# Patient Record
Sex: MALE | Race: WHITE | URBAN - METROPOLITAN AREA
[De-identification: names, ages, dates, MRNs, and addresses within clinical notes are randomized per-mention and may not be internally consistent; named-entity substitution may affect disease eponyms.]

---

## 2021-05-12 ENCOUNTER — OFFICE VISIT (OUTPATIENT)
Dept: URBAN - METROPOLITAN AREA CLINIC 51 | Facility: CLINIC | Age: 76
End: 2021-05-12
Payer: MEDICARE

## 2021-05-12 DIAGNOSIS — H35.363 DRUSEN (DEGENERATIVE) OF MACULA, BILATERAL: ICD-10-CM

## 2021-05-12 DIAGNOSIS — H52.4 PRESBYOPIA: ICD-10-CM

## 2021-05-12 DIAGNOSIS — H16.143 PUNCTATE KERATITIS, BILATERAL: ICD-10-CM

## 2021-05-12 DIAGNOSIS — H43.813 VITREOUS DEGENERATION, BILATERAL: ICD-10-CM

## 2021-05-12 DIAGNOSIS — H25.813 COMBINED FORMS OF AGE-RELATED CATARACT, BILATERAL: ICD-10-CM

## 2021-05-12 DIAGNOSIS — H33.199 OTHER RETINOSCHISIS AND RETINAL CYSTS, UNSPECIFIED EYE: Primary | ICD-10-CM

## 2021-05-12 PROCEDURE — 92134 CPTRZ OPH DX IMG PST SGM RTA: CPT | Performed by: OPTOMETRIST

## 2021-05-12 PROCEDURE — 99204 OFFICE O/P NEW MOD 45 MIN: CPT | Performed by: OPTOMETRIST

## 2021-05-12 ASSESSMENT — KERATOMETRY
OD: 45.42
OS: 45.27

## 2021-05-12 ASSESSMENT — INTRAOCULAR PRESSURE
OD: 10
OS: 11

## 2021-05-12 ASSESSMENT — VISUAL ACUITY
OD: 20/30
OS: 20/20

## 2021-05-12 NOTE — IMPRESSION/PLAN
Impression: Drusen (degenerative) of macula, bilateral: H35.363. Plan: Patient educated regarding findings. Recommend patient take an [employer]'s formula multiple vitamin daily. Observation is all that is indicated at this time. 
Macular OCT today reviewed with pt

## 2021-05-12 NOTE — IMPRESSION/PLAN
Impression: Punctate keratitis, bilateral: H16.143. Plan: Recommend use:
Lipid based Artificial tears QID OU Warm compresses BID Eyelid scrubs 2-3 times per week
> 600 mg Omega 3 per day Hydrate / Add humidifier / Avoid direct air flow / Blink fully

## 2021-05-12 NOTE — IMPRESSION/PLAN
Impression: Vitreous degeneration, bilateral: H43.813. Plan: Detailed examination of peripheral retina was performed and retina intact 360 degrees. Signs and risks of retinal detachment or tears were discussed. If pt. notices any symptoms including increase in floaters, flashes, curtain or veil, contact office ASAP. Recommend pt. return for normal recall.

## 2021-05-12 NOTE — IMPRESSION/PLAN
Impression: Combined forms of age-related cataract, bilateral: H25.813. Plan: No treatment currently recommended due to level of vision. Patient will monitor vision changes and contact us with any decrease in vision, will re-evaluate cataracts on return visit.

## 2021-06-07 ENCOUNTER — OFFICE VISIT (OUTPATIENT)
Dept: URBAN - METROPOLITAN AREA CLINIC 51 | Facility: CLINIC | Age: 76
End: 2021-06-07
Payer: MEDICARE

## 2021-06-07 PROCEDURE — 92134 CPTRZ OPH DX IMG PST SGM RTA: CPT | Performed by: OPHTHALMOLOGY

## 2021-06-07 PROCEDURE — 92004 COMPRE OPH EXAM NEW PT 1/>: CPT | Performed by: OPHTHALMOLOGY

## 2021-06-07 NOTE — IMPRESSION/PLAN
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 Plan: Macular schisis OU - asymptomatic.  Observe and review in 6 months for exam, sooner PRN

## 2021-12-14 ENCOUNTER — OFFICE VISIT (OUTPATIENT)
Dept: URBAN - METROPOLITAN AREA CLINIC 51 | Facility: CLINIC | Age: 76
End: 2021-12-14
Payer: MEDICARE

## 2021-12-14 PROCEDURE — 92134 CPTRZ OPH DX IMG PST SGM RTA: CPT | Performed by: OPHTHALMOLOGY

## 2021-12-14 PROCEDURE — 92014 COMPRE OPH EXAM EST PT 1/>: CPT | Performed by: OPHTHALMOLOGY

## 2021-12-14 ASSESSMENT — INTRAOCULAR PRESSURE
OD: 12
OS: 11

## 2021-12-14 NOTE — IMPRESSION/PLAN
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 Plan: Macular schisis OU - asymptomatic.  Observe and review in 12 months for exam, sooner PRN

## 2022-12-13 ENCOUNTER — OFFICE VISIT (OUTPATIENT)
Dept: URBAN - METROPOLITAN AREA CLINIC 51 | Facility: CLINIC | Age: 77
End: 2022-12-13
Payer: MEDICARE

## 2022-12-13 DIAGNOSIS — H33.199 OTHER RETINOSCHISIS AND RETINAL CYSTS, UNSPECIFIED EYE: Primary | ICD-10-CM

## 2022-12-13 PROCEDURE — 92134 CPTRZ OPH DX IMG PST SGM RTA: CPT | Performed by: OPHTHALMOLOGY

## 2022-12-13 PROCEDURE — 92014 COMPRE OPH EXAM EST PT 1/>: CPT | Performed by: OPHTHALMOLOGY

## 2022-12-13 ASSESSMENT — INTRAOCULAR PRESSURE
OD: 12
OS: 14

## 2022-12-13 NOTE — IMPRESSION/PLAN
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 Plan: Macular schisis OU - stable and asymptomatic.  Observe and review in 12 months for exam, sooner PRN

## 2023-12-12 ENCOUNTER — OFFICE VISIT (OUTPATIENT)
Dept: URBAN - METROPOLITAN AREA CLINIC 51 | Facility: CLINIC | Age: 78
End: 2023-12-12
Payer: MEDICARE

## 2023-12-12 DIAGNOSIS — H33.199 OTHER RETINOSCHISIS AND RETINAL CYSTS, UNSPECIFIED EYE: Primary | ICD-10-CM

## 2023-12-12 DIAGNOSIS — H17.822 PERIPHERAL OPACITY OF CORNEA OF LEFT EYE: ICD-10-CM

## 2023-12-12 PROCEDURE — 92134 CPTRZ OPH DX IMG PST SGM RTA: CPT | Performed by: OPHTHALMOLOGY

## 2023-12-12 PROCEDURE — 92014 COMPRE OPH EXAM EST PT 1/>: CPT | Performed by: OPHTHALMOLOGY

## 2023-12-12 ASSESSMENT — INTRAOCULAR PRESSURE
OD: 9
OS: 9

## 2024-04-10 ENCOUNTER — OFFICE VISIT (OUTPATIENT)
Dept: URBAN - METROPOLITAN AREA CLINIC 44 | Facility: CLINIC | Age: 79
End: 2024-04-10
Payer: MEDICARE

## 2024-04-10 DIAGNOSIS — H33.199 OTHER RETINOSCHISIS AND RETINAL CYSTS, UNSPECIFIED EYE: ICD-10-CM

## 2024-04-10 DIAGNOSIS — H17.822 PERIPHERAL OPACITY OF CORNEA OF LEFT EYE: ICD-10-CM

## 2024-04-10 DIAGNOSIS — H11.042 PERIPHERAL PTERYGIUM, STATIONARY, LEFT EYE: Primary | ICD-10-CM

## 2024-04-10 PROCEDURE — 92025 CPTRIZED CORNEAL TOPOGRAPHY: CPT | Performed by: OPHTHALMOLOGY

## 2024-04-10 PROCEDURE — 99214 OFFICE O/P EST MOD 30 MIN: CPT | Performed by: OPHTHALMOLOGY

## 2024-04-10 PROCEDURE — 76514 ECHO EXAM OF EYE THICKNESS: CPT | Performed by: OPHTHALMOLOGY

## 2024-04-10 ASSESSMENT — INTRAOCULAR PRESSURE
OD: 11
OS: 10

## 2025-01-07 ENCOUNTER — OFFICE VISIT (OUTPATIENT)
Dept: URBAN - METROPOLITAN AREA CLINIC 51 | Facility: CLINIC | Age: 80
End: 2025-01-07
Payer: MEDICARE

## 2025-01-07 DIAGNOSIS — H33.199 OTHER RETINOSCHISIS AND RETINAL CYSTS, UNSPECIFIED EYE: Primary | ICD-10-CM

## 2025-01-07 PROCEDURE — 92134 CPTRZ OPH DX IMG PST SGM RTA: CPT | Performed by: OPHTHALMOLOGY

## 2025-01-07 PROCEDURE — 92014 COMPRE OPH EXAM EST PT 1/>: CPT | Performed by: OPHTHALMOLOGY

## 2025-01-07 ASSESSMENT — INTRAOCULAR PRESSURE
OD: 12
OS: 9